# Patient Record
Sex: FEMALE | Race: ASIAN | NOT HISPANIC OR LATINO | URBAN - METROPOLITAN AREA
[De-identification: names, ages, dates, MRNs, and addresses within clinical notes are randomized per-mention and may not be internally consistent; named-entity substitution may affect disease eponyms.]

---

## 2023-03-22 ENCOUNTER — APPOINTMENT (RX ONLY)
Dept: URBAN - METROPOLITAN AREA CLINIC 61 | Facility: CLINIC | Age: 58
Setting detail: DERMATOLOGY
End: 2023-03-22

## 2023-03-22 DIAGNOSIS — L24 IRRITANT CONTACT DERMATITIS: ICD-10-CM | Status: INADEQUATELY CONTROLLED

## 2023-03-22 DIAGNOSIS — L81.1 CHLOASMA: ICD-10-CM

## 2023-03-22 PROBLEM — L24.9 IRRITANT CONTACT DERMATITIS, UNSPECIFIED CAUSE: Status: ACTIVE | Noted: 2023-03-22

## 2023-03-22 PROCEDURE — 99203 OFFICE O/P NEW LOW 30 MIN: CPT

## 2023-03-22 PROCEDURE — ? PRESCRIPTION

## 2023-03-22 PROCEDURE — ? COUNSELING

## 2023-03-22 PROCEDURE — ? PRESCRIPTION MEDICATION MANAGEMENT

## 2023-03-22 PROCEDURE — ? DIAGNOSIS COMMENT

## 2023-03-22 PROCEDURE — ? FULL BODY SKIN EXAM - DECLINED

## 2023-03-22 RX ORDER — MUPIROCIN 20 MG/G
1 OINTMENT TOPICAL BID
Qty: 22 | Refills: 1 | Status: ERX | COMMUNITY
Start: 2023-03-22

## 2023-03-22 RX ADMIN — MUPIROCIN 1: 20 OINTMENT TOPICAL at 00:00

## 2023-03-22 ASSESSMENT — PAIN INTENSITY VAS: HOW INTENSE IS YOUR PAIN 0 BEING NO PAIN, 10 BEING THE MOST SEVERE PAIN POSSIBLE?: NO PAIN

## 2023-03-22 ASSESSMENT — LOCATION SIMPLE DESCRIPTION DERM
LOCATION SIMPLE: RIGHT CHEEK
LOCATION SIMPLE: LEFT CHEEK

## 2023-03-22 ASSESSMENT — ITCH NUMERIC RATING SCALE: HOW SEVERE IS YOUR ITCHING?: 7

## 2023-03-22 ASSESSMENT — LOCATION ZONE DERM: LOCATION ZONE: FACE

## 2023-03-22 ASSESSMENT — BSA RASH: BSA RASH: 1

## 2023-03-22 ASSESSMENT — SEVERITY ASSESSMENT 2020: SEVERITY 2020: MODERATE

## 2023-03-22 ASSESSMENT — LOCATION DETAILED DESCRIPTION DERM
LOCATION DETAILED: LEFT INFERIOR CENTRAL MALAR CHEEK
LOCATION DETAILED: RIGHT CENTRAL MALAR CHEEK

## 2023-03-22 NOTE — PROCEDURE: PRESCRIPTION MEDICATION MANAGEMENT
Detail Level: Zone
Initiate Treatment: Mupirocin ointment BID
Plan: Mupirocin\\nBenadryl \\nAquaphor
Render In Strict Bullet Format?: No

## 2023-03-22 NOTE — HPI: SKIN IRRITATION
How Severe Is Your Skin Irritation?: moderate
Additional History: She had a chemical peel 3 days ago.

## 2023-03-22 NOTE — PROCEDURE: DIAGNOSIS COMMENT
Detail Level: Detailed
Render Risk Assessment In Note?: no
Comment: s/p cosmetic peel at another clinic

## 2023-03-30 ENCOUNTER — APPOINTMENT (RX ONLY)
Dept: URBAN - METROPOLITAN AREA CLINIC 61 | Facility: CLINIC | Age: 58
Setting detail: DERMATOLOGY
End: 2023-03-30

## 2023-03-30 DIAGNOSIS — Z41.9 ENCOUNTER FOR PROCEDURE FOR PURPOSES OTHER THAN REMEDYING HEALTH STATE, UNSPECIFIED: ICD-10-CM

## 2023-03-30 DIAGNOSIS — L81.1 CHLOASMA: ICD-10-CM

## 2023-03-30 DIAGNOSIS — L24 IRRITANT CONTACT DERMATITIS: ICD-10-CM | Status: RESOLVING

## 2023-03-30 PROBLEM — L24.9 IRRITANT CONTACT DERMATITIS, UNSPECIFIED CAUSE: Status: ACTIVE | Noted: 2023-03-30

## 2023-03-30 PROCEDURE — ? PRESCRIPTION MEDICATION MANAGEMENT

## 2023-03-30 PROCEDURE — ? PHOTO-DOCUMENTATION

## 2023-03-30 PROCEDURE — ? COSMETIC CONSULTATION: BOTOX

## 2023-03-30 PROCEDURE — 99213 OFFICE O/P EST LOW 20 MIN: CPT

## 2023-03-30 PROCEDURE — ? DIAGNOSIS COMMENT

## 2023-03-30 PROCEDURE — ? PRESCRIPTION

## 2023-03-30 PROCEDURE — ? COUNSELING

## 2023-03-30 RX ORDER — TRIAMCINOLONE ACETONIDE 0.25 MG/G
CREAM TOPICAL
Qty: 15 | Refills: 1 | Status: ERX | COMMUNITY
Start: 2023-03-30

## 2023-03-30 RX ORDER — PHARMACY COMPOUNDING ACCESSORY
EACH MISCELLANEOUS AS DIRECTED
Qty: 15 | Refills: 3 | Status: ERX | COMMUNITY
Start: 2023-03-30

## 2023-03-30 RX ADMIN — TRIAMCINOLONE ACETONIDE: 0.25 CREAM TOPICAL at 00:00

## 2023-03-30 RX ADMIN — Medication: at 00:00

## 2023-03-30 ASSESSMENT — LOCATION SIMPLE DESCRIPTION DERM
LOCATION SIMPLE: RIGHT CHEEK
LOCATION SIMPLE: CHIN
LOCATION SIMPLE: INFERIOR FOREHEAD
LOCATION SIMPLE: LEFT CHEEK

## 2023-03-30 ASSESSMENT — LOCATION DETAILED DESCRIPTION DERM
LOCATION DETAILED: LEFT CHIN
LOCATION DETAILED: INFERIOR MID FOREHEAD
LOCATION DETAILED: LEFT INFERIOR CENTRAL MALAR CHEEK
LOCATION DETAILED: RIGHT INFERIOR CENTRAL MALAR CHEEK

## 2023-03-30 ASSESSMENT — LOCATION ZONE DERM: LOCATION ZONE: FACE

## 2023-04-13 ENCOUNTER — APPOINTMENT (RX ONLY)
Dept: URBAN - METROPOLITAN AREA CLINIC 61 | Facility: CLINIC | Age: 58
Setting detail: DERMATOLOGY
End: 2023-04-13

## 2023-04-13 DIAGNOSIS — L24 IRRITANT CONTACT DERMATITIS: ICD-10-CM | Status: RESOLVED

## 2023-04-13 DIAGNOSIS — L81.1 CHLOASMA: ICD-10-CM

## 2023-04-13 PROBLEM — L24.9 IRRITANT CONTACT DERMATITIS, UNSPECIFIED CAUSE: Status: ACTIVE | Noted: 2023-04-13

## 2023-04-13 PROCEDURE — ? COUNSELING

## 2023-04-13 PROCEDURE — 99213 OFFICE O/P EST LOW 20 MIN: CPT

## 2023-04-13 PROCEDURE — ? PHOTO-DOCUMENTATION

## 2023-04-13 PROCEDURE — ? PRESCRIPTION MEDICATION MANAGEMENT

## 2023-04-13 ASSESSMENT — LOCATION SIMPLE DESCRIPTION DERM
LOCATION SIMPLE: RIGHT CHEEK
LOCATION SIMPLE: LEFT CHEEK
LOCATION SIMPLE: CHIN

## 2023-04-13 ASSESSMENT — LOCATION DETAILED DESCRIPTION DERM
LOCATION DETAILED: LEFT CHIN
LOCATION DETAILED: RIGHT INFERIOR CENTRAL MALAR CHEEK
LOCATION DETAILED: LEFT INFERIOR CENTRAL MALAR CHEEK

## 2023-04-13 ASSESSMENT — LOCATION ZONE DERM: LOCATION ZONE: FACE

## 2023-04-13 NOTE — PROCEDURE: PRESCRIPTION MEDICATION MANAGEMENT
Detail Level: Zone
Render In Strict Bullet Format?: No
Discontinue Regimen: Triamcinolone
Plan: Daily spf moisturizer
Initiate Treatment: Medrock cream on entire face qpm